# Patient Record
Sex: FEMALE | Race: WHITE
[De-identification: names, ages, dates, MRNs, and addresses within clinical notes are randomized per-mention and may not be internally consistent; named-entity substitution may affect disease eponyms.]

---

## 2017-11-20 ENCOUNTER — APPOINTMENT (OUTPATIENT)
Dept: ORTHOPEDIC SURGERY | Facility: CLINIC | Age: 57
End: 2017-11-20
Payer: OTHER MISCELLANEOUS

## 2017-11-20 VITALS
WEIGHT: 200 LBS | HEART RATE: 59 BPM | BODY MASS INDEX: 32.14 KG/M2 | SYSTOLIC BLOOD PRESSURE: 176 MMHG | HEIGHT: 66 IN | DIASTOLIC BLOOD PRESSURE: 95 MMHG

## 2017-11-20 DIAGNOSIS — Z87.891 PERSONAL HISTORY OF NICOTINE DEPENDENCE: ICD-10-CM

## 2017-11-20 DIAGNOSIS — Z87.09 PERSONAL HISTORY OF OTHER DISEASES OF THE RESPIRATORY SYSTEM: ICD-10-CM

## 2017-11-20 PROBLEM — Z00.00 ENCOUNTER FOR PREVENTIVE HEALTH EXAMINATION: Status: ACTIVE | Noted: 2017-11-20

## 2017-11-20 PROCEDURE — 99203 OFFICE O/P NEW LOW 30 MIN: CPT

## 2018-01-03 ENCOUNTER — APPOINTMENT (OUTPATIENT)
Dept: ORTHOPEDIC SURGERY | Facility: CLINIC | Age: 58
End: 2018-01-03
Payer: OTHER MISCELLANEOUS

## 2018-01-03 PROCEDURE — 99212 OFFICE O/P EST SF 10 MIN: CPT

## 2018-01-04 ENCOUNTER — APPOINTMENT (OUTPATIENT)
Dept: ORTHOPEDIC SURGERY | Facility: CLINIC | Age: 58
End: 2018-01-04
Payer: OTHER MISCELLANEOUS

## 2018-02-16 ENCOUNTER — CHART COPY (OUTPATIENT)
Age: 58
End: 2018-02-16

## 2018-03-08 PROBLEM — M77.12 LATERAL EPICONDYLITIS OF LEFT ELBOW: Status: ACTIVE | Noted: 2017-11-20

## 2018-03-12 ENCOUNTER — APPOINTMENT (OUTPATIENT)
Dept: ORTHOPEDIC SURGERY | Facility: CLINIC | Age: 58
End: 2018-03-12
Payer: OTHER MISCELLANEOUS

## 2018-03-12 VITALS — WEIGHT: 200 LBS | BODY MASS INDEX: 32.14 KG/M2 | HEIGHT: 66 IN

## 2018-03-12 DIAGNOSIS — M77.8 OTHER ENTHESOPATHIES, NOT ELSEWHERE CLASSIFIED: ICD-10-CM

## 2018-03-12 DIAGNOSIS — M77.12 LATERAL EPICONDYLITIS, LEFT ELBOW: ICD-10-CM

## 2018-03-12 PROCEDURE — 99213 OFFICE O/P EST LOW 20 MIN: CPT

## 2018-06-26 ENCOUNTER — OUTPATIENT (OUTPATIENT)
Dept: OUTPATIENT SERVICES | Facility: HOSPITAL | Age: 58
LOS: 1 days | End: 2018-06-26
Payer: COMMERCIAL

## 2018-06-26 PROCEDURE — 93971 EXTREMITY STUDY: CPT | Mod: 26,RT

## 2022-01-31 ENCOUNTER — NON-APPOINTMENT (OUTPATIENT)
Age: 62
End: 2022-01-31

## 2022-01-31 ENCOUNTER — TRANSCRIPTION ENCOUNTER (OUTPATIENT)
Age: 62
End: 2022-01-31

## 2022-01-31 ENCOUNTER — APPOINTMENT (OUTPATIENT)
Dept: OPHTHALMOLOGY | Facility: CLINIC | Age: 62
End: 2022-01-31
Payer: SELF-PAY

## 2022-01-31 PROCEDURE — 92002 INTRM OPH EXAM NEW PATIENT: CPT

## 2022-06-05 ENCOUNTER — APPOINTMENT (OUTPATIENT)
Dept: AFTER HOURS CARE | Facility: EMERGENCY ROOM | Age: 62
End: 2022-06-05
Payer: SELF-PAY

## 2022-06-05 DIAGNOSIS — Z86.39 PERSONAL HISTORY OF OTHER ENDOCRINE, NUTRITIONAL AND METABOLIC DISEASE: ICD-10-CM

## 2022-06-05 PROCEDURE — 99204 OFFICE O/P NEW MOD 45 MIN: CPT | Mod: 95

## 2022-06-05 NOTE — PHYSICAL EXAM
[No Acute Distress] : no acute distress [Well Nourished] : well nourished [Well Developed] : well developed [Well-Appearing] : well-appearing [Normal Sclera/Conjunctiva] : normal sclera/conjunctiva [PERRL] : pupils equal round and reactive to light [EOMI] : extraocular movements intact [Normal Outer Ear/Nose] : the outer ears and nose were normal in appearance [Normal Oropharynx] : the oropharynx was normal [No JVD] : no jugular venous distention [Supple] : supple [No Respiratory Distress] : no respiratory distress  [Normal Rate] : normal rate  [No Edema] : there was no peripheral edema [Soft] : abdomen soft [Non Tender] : non-tender [Non-distended] : non-distended [No CVA Tenderness] : no CVA  tenderness [No Spinal Tenderness] : no spinal tenderness [No Joint Swelling] : no joint swelling [Grossly Normal Strength/Tone] : grossly normal strength/tone [Normal Gait] : normal gait [Coordination Grossly Intact] : coordination grossly intact [No Focal Deficits] : no focal deficits [Normal Affect] : the affect was normal [Normal Insight/Judgement] : insight and judgment were intact [de-identified] : Middle aged f in no distress [de-identified] : left leg nontender, pt c/o pain deep n hamstring, neurovascularly intact, FROM at knee and hip

## 2022-06-05 NOTE — PLAN
[FreeTextEntry1] : Will send hydrochlorothiazide to pharmacy, advised to follow up with her pcp on Wednesday at her scheduled apopointment.  Advised to watch salt intake, and go to ED should she develop any symptoms such as chest pain, sob, lightheaded or nausea.

## 2022-06-05 NOTE — HISTORY OF PRESENT ILLNESS
[Home] : at home, [unfilled] , at the time of the visit. [Other Location: e.g. Home (Enter Location, City,State)___] : at [unfilled] [Spouse] : spouse [Verbal consent obtained from patient] : the patient, [unfilled] [FreeTextEntry8] : 62 yo f with hypothyroidism on synthroid presents with asymptomatic hypertension.  She has been taking ibuprofen for leg pain, as she injured her hamstring at the gym while walking on the treadmill.  She took her blood pressure yesterday and found it to be 180 systolic x 2.  She took it again today and it was 190 systolic. She is not having any chest pain or shortness of breath.  Her hyoertension began yesterday and she only took her blood pressure as she has been taking ibuprofen. She used to take losartan in 2017 but it was discontinued by her doctor when they found out she had hypothyroidism. No precipitating, exacerbating or alleviating factors.  Leg paoin is dull, makes it hard to walk up stairs.

## 2022-06-05 NOTE — REVIEW OF SYSTEMS
[Joint Stiffness] : no joint stiffness [Joint Swelling] : no joint swelling [Muscle Pain] : no muscle pain [Back Pain] : no back pain [Negative] : Heme/Lymph [FreeTextEntry9] : leg pain

## 2022-06-09 ENCOUNTER — APPOINTMENT (OUTPATIENT)
Dept: CARDIOLOGY | Facility: CLINIC | Age: 62
End: 2022-06-09
Payer: SELF-PAY

## 2022-06-09 ENCOUNTER — NON-APPOINTMENT (OUTPATIENT)
Age: 62
End: 2022-06-09

## 2022-06-09 ENCOUNTER — APPOINTMENT (OUTPATIENT)
Dept: CARDIOLOGY | Facility: CLINIC | Age: 62
End: 2022-06-09

## 2022-06-09 VITALS
BODY MASS INDEX: 36.65 KG/M2 | HEART RATE: 60 BPM | DIASTOLIC BLOOD PRESSURE: 96 MMHG | OXYGEN SATURATION: 97 % | TEMPERATURE: 97.7 F | SYSTOLIC BLOOD PRESSURE: 174 MMHG | HEIGHT: 65 IN | WEIGHT: 220 LBS

## 2022-06-09 DIAGNOSIS — E66.3 OVERWEIGHT: ICD-10-CM

## 2022-06-09 DIAGNOSIS — Z78.9 OTHER SPECIFIED HEALTH STATUS: ICD-10-CM

## 2022-06-09 DIAGNOSIS — E03.9 HYPOTHYROIDISM, UNSPECIFIED: ICD-10-CM

## 2022-06-09 DIAGNOSIS — R53.83 OTHER FATIGUE: ICD-10-CM

## 2022-06-09 DIAGNOSIS — M79.606 PAIN IN LEG, UNSPECIFIED: ICD-10-CM

## 2022-06-09 PROCEDURE — 99203 OFFICE O/P NEW LOW 30 MIN: CPT

## 2022-06-09 RX ORDER — CELECOXIB 200 MG/1
200 CAPSULE ORAL DAILY
Qty: 20 | Refills: 1 | Status: DISCONTINUED | COMMUNITY
Start: 2017-11-20 | End: 2022-06-09

## 2022-06-09 RX ORDER — LEVOTHYROXINE SODIUM 175 UG/1
175 TABLET ORAL
Refills: 0 | Status: ACTIVE | COMMUNITY

## 2022-06-09 RX ORDER — IBUPROFEN 200 MG/1
200 TABLET, FILM COATED ORAL 3 TIMES DAILY
Refills: 0 | Status: ACTIVE | COMMUNITY
Start: 2022-06-09

## 2022-06-09 NOTE — REVIEW OF SYSTEMS
[Feeling Fatigued] : feeling fatigued [Dyspnea on exertion] : dyspnea during exertion [Joint Pain] : joint pain [Negative] : Heme/Lymph

## 2022-06-09 NOTE — HISTORY OF PRESENT ILLNESS
[FreeTextEntry1] : Renate is a pleasant 61-year-old female with history of hypertension.  Was first noted few years ago but she was not started on blood pressure medications at that time.  Subsequent several blood pressure readings over the next few years did not show hypertension.  She started getting high blood pressure readings for the past 1 to 2 weeks.  She is mostly asymptomatic for it.  Over the weekend, she had telemetry doc visit and was started on HCTZ 12.5 mg daily.  At its peak, her systolic blood pressure was 200 but was asymptomatic.\par \par Should be noted that she was taking high-dose NSAIDs recently for arthritis and tendinitis.  The dose of NSAID has been reduced but she still takes 200 to 300 mg of Motrin\par \par Complains of fatigue.  She has resting sinus bradycardia.  She is on Synthroid for hypothyroidism.  Last TSH was a year ago.\par \par She denies any chest pain or chest discomfort.  She has mild shortness of breath on exertion.  No PND, orthopnea or pedal edema. No past history of major cardiovascular events.  She denies palpitations

## 2022-06-09 NOTE — DISCUSSION/SUMMARY
[FreeTextEntry1] : Uncontrolled hypertension.  Nonpharmacologic ways of reducing blood pressure were discussed.  In her case, reduction of NSAID, stop alcohol intake and reduction of weight would be quite helpful.  She is already on HCTZ 12.5 mg p.o. daily without any side effects.  She will continue this.  Add amlodipine 5 mg daily.\par \par Complaint of fatigue and resting sinus bradycardia on Synthroid: I have given her a lab slip to check CMP and fasting lipid profile as well as TSH.\par \par Echocardiogram should be obtained looking for hypertensive changes\par \par Depending upon her blood pressure in future, additional ARB may be needed.\par \par Overweight: Dietary changes and lifestyle intervention to reduce blood pressure were discussed.\par \par Once her blood pressure is controlled, and ETT would be valuable in determining exercise capacity and blood pressure response\par \par Follow-up after above tests\par \par \par Sincerely,\par \par Mehran Alcazar MD, FACC, NATHAN

## 2022-06-28 ENCOUNTER — APPOINTMENT (OUTPATIENT)
Dept: CARDIOLOGY | Facility: CLINIC | Age: 62
End: 2022-06-28

## 2022-06-28 VITALS — SYSTOLIC BLOOD PRESSURE: 144 MMHG | DIASTOLIC BLOOD PRESSURE: 84 MMHG

## 2022-06-28 VITALS
HEART RATE: 59 BPM | TEMPERATURE: 97.3 F | OXYGEN SATURATION: 99 % | WEIGHT: 220 LBS | BODY MASS INDEX: 36.65 KG/M2 | DIASTOLIC BLOOD PRESSURE: 88 MMHG | SYSTOLIC BLOOD PRESSURE: 140 MMHG | HEIGHT: 65 IN

## 2022-06-28 DIAGNOSIS — E78.5 HYPERLIPIDEMIA, UNSPECIFIED: ICD-10-CM

## 2022-06-28 DIAGNOSIS — I10 ESSENTIAL (PRIMARY) HYPERTENSION: ICD-10-CM

## 2022-06-28 PROCEDURE — 93306 TTE W/DOPPLER COMPLETE: CPT

## 2022-06-28 PROCEDURE — 99213 OFFICE O/P EST LOW 20 MIN: CPT

## 2022-06-28 NOTE — PHYSICAL EXAM
[No Edema] : no edema [No Rash] : no rash [Moves all extremities] : moves all extremities [Alert and Oriented] : alert and oriented [Normal] : clear lung fields, good air entry, no respiratory distress [de-identified] : overweight [de-identified] : varicosities

## 2022-06-28 NOTE — HISTORY OF PRESENT ILLNESS
[FreeTextEntry1] : Renate is a pleasant 61-year-old female with history of hypertension.\par \par Initial consult was on 6/9/2022. Today she presents to follow up on recent testing and for BP check.  Over the past 3 weeks she started getting high blood pressure readings.  At its peak, her systolic blood pressure was 200 but was asymptomatic. She is mostly asymptomatic for it.  She was previously started on HCTZ 25 mg daily.  \par \par Should be noted that she was taking high-dose NSAIDs recently for arthritis and tendinitis.  The dose of NSAID has been reduced but she still takes 200 to 300 mg of Motrin\par \par Complains of fatigue.  She has resting sinus bradycardia.  She is on Synthroid for hypothyroidism. \par \par She denies any chest pain or chest discomfort.  She has mild shortness of breath on exertion.  No PND, orthopnea or pedal edema. No past history of major cardiovascular events.  She denies palpitations

## 2022-06-28 NOTE — DISCUSSION/SUMMARY
[FreeTextEntry1] : Hypertension- Cont amlodipine 5mg, HCTZ 25mg add losartan 25mg. \par She will have a BP recheck with her PCP in 2 weeks. \par Nonpharmacologic ways of reducing blood pressure were discussed.  In her case, reduction of NSAID, stop alcohol intake and reduction of weight would be quite helpful.  Drinks 2 glasses wine daily\par -Echo reported above and reviewed\par -repeat BMP in 1 month\par \par HLD: Pt would prefer not to take statins at this time. Ordered CT calcium score. ASCVD 6.66%. Risks/Benefits of statin therapy discussed. Treatment pending CT calcium score results. Pt will call for the results of the test. \par dietary changes discussed in detail.\par \par Hypothyroidism: TSH 3.80\par \par Overweight: Dietary changes and lifestyle intervention to reduce blood pressure were discussed.\par \par Once her blood pressure is controlled, and ETT would be valuable in determining exercise capacity and blood pressure response. Pt would like to defer for now since she does not have insurance\par \par Routine 6 month follow up or sooner if needed \par Discussed red flag symptoms, which would warrant sooner or emergent medical evaluation.\par Any questions and concerns were addressed and resolved. \par Sincerely,\par \par Leyla WATKINS-C\par Patients history, testing, and plan reviewed with supervising MD: Dr. Mehran Alcazar MD, [facility], [facility]

## 2022-06-28 NOTE — CARDIOLOGY SUMMARY
[de-identified] : 6/9/22: SB 58 bpm Early repolarization of ST in inferior leads. No change from 2018 [de-identified] : 6/28/2022: EF 60-65% Mild MR Normal LV systolic function and no seg. wall motion abnormalities. Mild TR Normal PASP.  [de-identified] : Labs: Creat 1.11  ALT 51 TSH 3.38 Total Chol 211   \par Labs: 6/21/2022 TSH 1.76 Hgb 14.6 HCT 42.6

## 2022-07-01 ENCOUNTER — RX RENEWAL (OUTPATIENT)
Age: 62
End: 2022-07-01

## 2022-07-19 ENCOUNTER — APPOINTMENT (OUTPATIENT)
Dept: RADIOLOGY | Facility: CLINIC | Age: 62
End: 2022-07-19

## 2022-07-19 PROCEDURE — 72110 X-RAY EXAM L-2 SPINE 4/>VWS: CPT

## 2022-07-22 ENCOUNTER — NON-APPOINTMENT (OUTPATIENT)
Age: 62
End: 2022-07-22

## 2022-07-22 RX ORDER — HYDROCHLOROTHIAZIDE 25 MG/1
25 TABLET ORAL
Qty: 90 | Refills: 3 | Status: DISCONTINUED | COMMUNITY
Start: 2022-06-09 | End: 2022-07-22

## 2022-07-22 RX ORDER — LOSARTAN POTASSIUM 25 MG/1
25 TABLET, FILM COATED ORAL DAILY
Qty: 90 | Refills: 2 | Status: DISCONTINUED | COMMUNITY
Start: 2022-06-28 | End: 2022-07-22

## 2022-07-22 RX ORDER — AMLODIPINE BESYLATE 2.5 MG/1
2.5 TABLET ORAL DAILY
Qty: 90 | Refills: 1 | Status: DISCONTINUED | COMMUNITY
Start: 2022-06-09 | End: 2022-07-22

## 2022-12-06 ENCOUNTER — APPOINTMENT (OUTPATIENT)
Dept: CARDIOLOGY | Facility: CLINIC | Age: 62
End: 2022-12-06

## 2022-12-06 NOTE — CARDIOLOGY SUMMARY
[de-identified] : 6/9/22: SB 58 bpm Early repolarization of ST in inferior leads. No change from 2018 [de-identified] : 6/28/2022: EF 60-65% Mild MR Normal LV systolic function and no seg. wall motion abnormalities. Mild TR Normal PASP.  [de-identified] : Labs: Creat 1.11  ALT 51 TSH 3.38 Total Chol 211   \par Labs: 6/21/2022 TSH 1.76 Hgb 14.6 HCT 42.6

## 2022-12-06 NOTE — PHYSICAL EXAM
[Normal] : normal gait [No Edema] : no edema [No Rash] : no rash [Moves all extremities] : moves all extremities [Alert and Oriented] : alert and oriented [de-identified] : overweight [de-identified] : varicosities

## 2022-12-06 NOTE — HISTORY OF PRESENT ILLNESS
[FreeTextEntry1] : Renate is a pleasant 62-year-old female with history of hypertension.\par \par Today she presents to follow up. She has been feeling well and BPs controlled in office today. \par   Over the past 3 weeks she started getting high blood pressure readings.  At its peak, her systolic blood pressure was 200 but was asymptomatic. She is mostly asymptomatic for it.  She was previously started on HCTZ 25 mg daily.  \par \par Should be noted that she was taking high-dose NSAIDs recently for arthritis and tendinitis.  The dose of NSAID has been reduced but she still takes 200 to 300 mg of Motrin\par \par Complains of fatigue.  She has resting sinus bradycardia.  She is on Synthroid for hypothyroidism. \par \par She denies any chest pain or chest discomfort.  She has mild shortness of breath on exertion.  No PND, orthopnea or pedal edema. No past history of major cardiovascular events.  She denies palpitations

## 2022-12-06 NOTE — DISCUSSION/SUMMARY
[FreeTextEntry1] : WILLIAM SWARTZ is a 62 year old F who presents today  with the above history and the following active issues: \par \par Hypertension- Cont amlodipine 5mg, HCTZ 25mg add losartan 25mg. \par Nonpharmacologic ways of reducing blood pressure were discussed.  \par In her case, reduction of NSAID, stop alcohol intake and reduction of weight would be quite helpful.  Drinks 2 glasses wine daily\par -repeat BMP in 1 month\par \par HLD: Pt would prefer not to take statins at this time. Ordered CT calcium score. ASCVD 6.66%. Risks/Benefits of statin therapy discussed. Treatment pending CT calcium score results. Pt will call for the results of the test. \par dietary changes discussed in detail.\par \par Hypothyroidism: TSH 3.80\par \par Overweight: Dietary changes and lifestyle intervention to reduce blood pressure were discussed.\par \par Once her blood pressure is controlled, and ETT would be valuable in determining exercise capacity and blood pressure response. Pt would like to defer for now since she does not have insurance\par \par Routine 6 month follow up or sooner if needed \par Discussed red flag symptoms, which would warrant sooner or emergent medical evaluation.\par Any questions and concerns were addressed and resolved. \par Sincerely,\par \par Leyla PELAEZC\par Patients history, testing, and plan reviewed with supervising MD: Dr. Mehran Alcazar MD, [facility], [facility]

## 2023-01-30 ENCOUNTER — APPOINTMENT (OUTPATIENT)
Dept: OPHTHALMOLOGY | Facility: CLINIC | Age: 63
End: 2023-01-30

## 2024-08-21 ENCOUNTER — APPOINTMENT (OUTPATIENT)
Dept: ORTHOPEDIC SURGERY | Facility: CLINIC | Age: 64
End: 2024-08-21
Payer: COMMERCIAL

## 2024-08-21 DIAGNOSIS — G57.62 LESION OF PLANTAR NERVE, LEFT LOWER LIMB: ICD-10-CM

## 2024-08-21 PROCEDURE — 99203 OFFICE O/P NEW LOW 30 MIN: CPT

## 2024-08-21 NOTE — PHYSICAL EXAM
[Mild] : mild swelling of dorsal foot [2nd] : 2nd [3rd] : 3rd [4th] : 4th [5___] : plantar flexion 5[unfilled]/5 [2+] : posterior tibialis pulse: 2+ [] : patient ambulates without assistive device [Left] : left foot [Outside films reviewed] : Outside films reviewed [There are no fractures, subluxations or dislocations. No significant abnormalities are seen] : There are no fractures, subluxations or dislocations. No significant abnormalities are seen

## 2024-08-21 NOTE — HISTORY OF PRESENT ILLNESS
[de-identified] : Patient presents for LT foot pain evaluation. Patient states that she had a neuroma on her LT foot 35 years ago and underwent excision then. Patient has tingling and burning on the foot that radiates to the ball of her foot for the past 6 weeks. Patient had x-rays 5 weeks ago. Patient took Advil for about a week but has stopped since then because of minimal relief.

## 2024-08-21 NOTE — DISCUSSION/SUMMARY
[de-identified] : I reviewed patient's prior left foot radiographs and discussed her condition and treatment options.  Given continued pain and interference with ADLS despite NSAIDs, rest, and HEP, I advised obtaining further imaging.  Based on the results of the MRI, more invasive interventions including injections and surgery may be indicated.  Follow up with Dr. Braxton after MRI foot.  Patient voiced understanding and agreement with the plan.

## 2024-08-21 NOTE — ALLERGIES
[TextEntry] : Patient is allergic to an antibiotic but doesn't remember the name. She broke out in hives.

## 2024-08-24 ENCOUNTER — APPOINTMENT (OUTPATIENT)
Dept: MRI IMAGING | Facility: CLINIC | Age: 64
End: 2024-08-24

## 2024-08-24 ENCOUNTER — RESULT REVIEW (OUTPATIENT)
Age: 64
End: 2024-08-24

## 2024-08-24 PROCEDURE — 73718 MRI LOWER EXTREMITY W/O DYE: CPT | Mod: LT

## 2024-09-04 ENCOUNTER — APPOINTMENT (OUTPATIENT)
Dept: ORTHOPEDIC SURGERY | Facility: CLINIC | Age: 64
End: 2024-09-04
Payer: COMMERCIAL

## 2024-09-04 VITALS — BODY MASS INDEX: 33.32 KG/M2 | WEIGHT: 200 LBS | HEIGHT: 65 IN

## 2024-09-04 DIAGNOSIS — Q66.72 CONGEN PES CAVUS, LT FOOT: ICD-10-CM

## 2024-09-04 DIAGNOSIS — S99.922A UNSPECIFIED INJURY OF LEFT FOOT, INITIAL ENCOUNTER: ICD-10-CM

## 2024-09-04 DIAGNOSIS — M77.52 OTHER ENTHESOPATHY OF LT FOOT AND ANKLE: ICD-10-CM

## 2024-09-04 DIAGNOSIS — M79.672 PAIN IN LEFT FOOT: ICD-10-CM

## 2024-09-04 PROCEDURE — 73630 X-RAY EXAM OF FOOT: CPT | Mod: LT

## 2024-09-04 PROCEDURE — 99214 OFFICE O/P EST MOD 30 MIN: CPT

## 2024-09-04 NOTE — PHYSICAL EXAM
[de-identified] : Examination of the left foot and ankle is as follows: INSPECTION: pes cavus alignment, no abrasion, no laceration, no erythema, no ecchymosis, no gross deformity PALPATION: ttp over plantar aspect of 2nd and 3rd metatarsal heads  ROM: dorsiflexion 20 degrees, plantar flexion 40 degrees, inversion 30 degrees, eversion 20 degrees. STRENGTH: dorsiflexion 5/5. plantar flexion 5/5, inversion 5/5, eversion 5/5, EHL 5/5, FHL 5/5 VASCULAR: dorsalis pedis pulse: 2+, posterior tibialis pulse: 2+ NEURO: Sensation present to light touch in all distributions GAIT: mildly antalgic, but patient ambulates without assistive device  X-rays of the left foot is as follows: Foot 3 view: pes cavus alignment. 4th proximal phalanx discontinuity secondary to prior surgery. There are no fractures, subluxations or dislocations. No significant abnormalities are seen.

## 2024-09-04 NOTE — ASSESSMENT
[FreeTextEntry1] : 64 yo female presenting today with left pes cavus foot deformity, moderate to high-grade plantar plate tear at the lateral aspect of the second MTP joint with marked adjacent soft tissue edema and intermetatarsal bursitis along with bone marrow edema primarily in the proximal phalanx and to a lesser extent within the second metatarsal head compatible with stress reaction or osseous contusion. Patient has been having symptoms for the past 3 months with no relief. -Discussed with patient non-surgical modalities of rest, ice, NSAIDs, activity modifications, DME. -Advised patient OTC metatarsal padding -Activities as tolerated, no restrictions -Rest, ice, compression, elevation, NSAIDs PRN for pain.  -All questions answered -F/u 6 weeks  The diagnosis was explained in detail. The potential non-surgical and surgical treatments were reviewed. The relative risks and benefits of each option were considered relative to the patients age, activity level, medical history, symptom severity and previously attempted treatments.  The patient was advised to consult with their primary medical provider prior to initiation of any new medications to reduce the risk of adverse effects specific to their long-term home medications and medical history. The risk of gastrointestinal irritation and kidney injury specific to long-term NSAID use was discussed.  Entered by Everardo Reyes PA-C acting as scribe. Dr. Braxton Attestation The documentation recorded by the scribe, in my presence, accurately reflects the service I, Dr. Braxton, personally performed, and the decisions made by me with my edits as appropriate.

## 2024-09-04 NOTE — HISTORY OF PRESENT ILLNESS
[Sudden] : sudden [9] : 9 [0] : 0 [Burning] : burning [Dull/Aching] : dull/aching [Sharp] : sharp [Stabbing] : stabbing [Throbbing] : throbbing [Intermittent] : intermittent [Household chores] : household chores [Leisure] : leisure [Social interactions] : social interactions [Rest] : rest [Full time] : Work status: full time [de-identified] : Patient here for left foot. Patient had MRI at [facility] on 8/24/2024. Patient states NKI. Patient states pain started 6/2024. Patient states pain is sharp, aching, burning and throbbing 2nd metatarsal that is constant with weight bearing.  IMPRESSION:  Moderate to high-grade plantar plate tear at the lateral aspect of the second MTP joint with marked adjacent soft tissue edema and intermetatarsal bursitis along with bone marrow edema primarily in the proximal phalanx and to a lesser extent within the second metatarsal head compatible with stress reaction or osseous contusion. Mild fraying of the third digit plantar plate. [] : no [FreeTextEntry1] : Left foot  [FreeTextEntry3] : 6/2024 [FreeTextEntry5] : NKI  [de-identified] : Movement  [de-identified] : [employer]

## 2024-10-02 ENCOUNTER — APPOINTMENT (OUTPATIENT)
Dept: MAMMOGRAPHY | Facility: CLINIC | Age: 64
End: 2024-10-02
Payer: COMMERCIAL

## 2024-10-02 PROCEDURE — 77067 SCR MAMMO BI INCL CAD: CPT

## 2024-10-02 PROCEDURE — 77063 BREAST TOMOSYNTHESIS BI: CPT

## 2024-10-16 ENCOUNTER — APPOINTMENT (OUTPATIENT)
Dept: ORTHOPEDIC SURGERY | Facility: CLINIC | Age: 64
End: 2024-10-16

## 2024-12-09 ENCOUNTER — APPOINTMENT (OUTPATIENT)
Dept: OPHTHALMOLOGY | Facility: CLINIC | Age: 64
End: 2024-12-09
Payer: SELF-PAY

## 2024-12-09 ENCOUNTER — APPOINTMENT (OUTPATIENT)
Dept: OPHTHALMOLOGY | Facility: CLINIC | Age: 64
End: 2024-12-09
Payer: COMMERCIAL

## 2024-12-09 ENCOUNTER — NON-APPOINTMENT (OUTPATIENT)
Age: 64
End: 2024-12-09

## 2024-12-09 PROCEDURE — 92014 COMPRE OPH EXAM EST PT 1/>: CPT

## 2024-12-09 PROCEDURE — 92015 DETERMINE REFRACTIVE STATE: CPT
